# Patient Record
Sex: MALE | Race: WHITE | NOT HISPANIC OR LATINO | Employment: OTHER | URBAN - METROPOLITAN AREA
[De-identification: names, ages, dates, MRNs, and addresses within clinical notes are randomized per-mention and may not be internally consistent; named-entity substitution may affect disease eponyms.]

---

## 2021-04-12 ENCOUNTER — HOSPITAL ENCOUNTER (EMERGENCY)
Facility: HOSPITAL | Age: 66
Discharge: HOME | End: 2021-04-12
Attending: EMERGENCY MEDICINE | Admitting: EMERGENCY MEDICINE
Payer: MEDICARE

## 2021-04-12 VITALS
WEIGHT: 205.2 LBS | BODY MASS INDEX: 33.12 KG/M2 | HEART RATE: 98 BPM | SYSTOLIC BLOOD PRESSURE: 127 MMHG | RESPIRATION RATE: 18 BRPM | OXYGEN SATURATION: 99 % | DIASTOLIC BLOOD PRESSURE: 65 MMHG | TEMPERATURE: 98.7 F

## 2021-04-12 DIAGNOSIS — L97.929 ULCERS OF BOTH LOWER LEGS (HCC): Primary | ICD-10-CM

## 2021-04-12 DIAGNOSIS — L97.919 ULCERS OF BOTH LOWER LEGS (HCC): Primary | ICD-10-CM

## 2021-04-12 LAB
ALBUMIN SERPL BCP-MCNC: 2.2 G/DL (ref 3.5–5)
ALP SERPL-CCNC: 110 U/L (ref 46–116)
ALT SERPL W P-5'-P-CCNC: 38 U/L (ref 12–78)
ANION GAP SERPL CALCULATED.3IONS-SCNC: 10 MMOL/L (ref 4–13)
APTT PPP: 39 SECONDS (ref 23–37)
AST SERPL W P-5'-P-CCNC: 53 U/L (ref 5–45)
BASOPHILS # BLD AUTO: 0.07 THOUSANDS/ΜL (ref 0–0.1)
BASOPHILS NFR BLD AUTO: 1 % (ref 0–1)
BILIRUB SERPL-MCNC: 0.44 MG/DL (ref 0.2–1)
BUN SERPL-MCNC: 14 MG/DL (ref 5–25)
CALCIUM ALBUM COR SERPL-MCNC: 10.6 MG/DL (ref 8.3–10.1)
CALCIUM SERPL-MCNC: 9.2 MG/DL (ref 8.3–10.1)
CHLORIDE SERPL-SCNC: 97 MMOL/L (ref 100–108)
CO2 SERPL-SCNC: 27 MMOL/L (ref 21–32)
CREAT SERPL-MCNC: 1.06 MG/DL (ref 0.6–1.3)
EOSINOPHIL # BLD AUTO: 0.3 THOUSAND/ΜL (ref 0–0.61)
EOSINOPHIL NFR BLD AUTO: 3 % (ref 0–6)
ERYTHROCYTE [DISTWIDTH] IN BLOOD BY AUTOMATED COUNT: 13.4 % (ref 11.6–15.1)
EST. AVERAGE GLUCOSE BLD GHB EST-MCNC: 134 MG/DL
GFR SERPL CREATININE-BSD FRML MDRD: 73 ML/MIN/1.73SQ M
GLUCOSE SERPL-MCNC: 135 MG/DL (ref 65–140)
HBA1C MFR BLD: 6.3 %
HCT VFR BLD AUTO: 40.5 % (ref 36.5–49.3)
HGB BLD-MCNC: 13.3 G/DL (ref 12–17)
HOLD SPECIMEN: NORMAL
IMM GRANULOCYTES # BLD AUTO: 0.12 THOUSAND/UL (ref 0–0.2)
IMM GRANULOCYTES NFR BLD AUTO: 1 % (ref 0–2)
INR PPP: 1.16 (ref 0.84–1.19)
LACTATE SERPL-SCNC: 2.4 MMOL/L (ref 0.5–2)
LYMPHOCYTES # BLD AUTO: 1.39 THOUSANDS/ΜL (ref 0.6–4.47)
LYMPHOCYTES NFR BLD AUTO: 11 % (ref 14–44)
MCH RBC QN AUTO: 28.9 PG (ref 26.8–34.3)
MCHC RBC AUTO-ENTMCNC: 32.8 G/DL (ref 31.4–37.4)
MCV RBC AUTO: 88 FL (ref 82–98)
MONOCYTES # BLD AUTO: 0.71 THOUSAND/ΜL (ref 0.17–1.22)
MONOCYTES NFR BLD AUTO: 6 % (ref 4–12)
NEUTROPHILS # BLD AUTO: 9.59 THOUSANDS/ΜL (ref 1.85–7.62)
NEUTS SEG NFR BLD AUTO: 78 % (ref 43–75)
NRBC BLD AUTO-RTO: 0 /100 WBCS
PLATELET # BLD AUTO: 288 THOUSANDS/UL (ref 149–390)
PMV BLD AUTO: 8.9 FL (ref 8.9–12.7)
POTASSIUM SERPL-SCNC: 4 MMOL/L (ref 3.5–5.3)
PROCALCITONIN SERPL-MCNC: 0.27 NG/ML
PROT SERPL-MCNC: 9.2 G/DL (ref 6.4–8.2)
PROTHROMBIN TIME: 14.9 SECONDS (ref 11.6–14.5)
RBC # BLD AUTO: 4.6 MILLION/UL (ref 3.88–5.62)
SODIUM SERPL-SCNC: 134 MMOL/L (ref 136–145)
WBC # BLD AUTO: 12.18 THOUSAND/UL (ref 4.31–10.16)

## 2021-04-12 PROCEDURE — 87040 BLOOD CULTURE FOR BACTERIA: CPT | Performed by: PHYSICIAN ASSISTANT

## 2021-04-12 PROCEDURE — 84145 PROCALCITONIN (PCT): CPT | Performed by: PHYSICIAN ASSISTANT

## 2021-04-12 PROCEDURE — 96366 THER/PROPH/DIAG IV INF ADDON: CPT

## 2021-04-12 PROCEDURE — 85025 COMPLETE CBC W/AUTO DIFF WBC: CPT | Performed by: EMERGENCY MEDICINE

## 2021-04-12 PROCEDURE — 36415 COLL VENOUS BLD VENIPUNCTURE: CPT

## 2021-04-12 PROCEDURE — 83036 HEMOGLOBIN GLYCOSYLATED A1C: CPT | Performed by: PHYSICIAN ASSISTANT

## 2021-04-12 PROCEDURE — 85730 THROMBOPLASTIN TIME PARTIAL: CPT | Performed by: PHYSICIAN ASSISTANT

## 2021-04-12 PROCEDURE — 83605 ASSAY OF LACTIC ACID: CPT | Performed by: PHYSICIAN ASSISTANT

## 2021-04-12 PROCEDURE — 93005 ELECTROCARDIOGRAM TRACING: CPT

## 2021-04-12 PROCEDURE — 96365 THER/PROPH/DIAG IV INF INIT: CPT

## 2021-04-12 PROCEDURE — 99283 EMERGENCY DEPT VISIT LOW MDM: CPT

## 2021-04-12 PROCEDURE — 80053 COMPREHEN METABOLIC PANEL: CPT | Performed by: EMERGENCY MEDICINE

## 2021-04-12 PROCEDURE — 99285 EMERGENCY DEPT VISIT HI MDM: CPT | Performed by: EMERGENCY MEDICINE

## 2021-04-12 PROCEDURE — 96375 TX/PRO/DX INJ NEW DRUG ADDON: CPT

## 2021-04-12 PROCEDURE — 85610 PROTHROMBIN TIME: CPT | Performed by: PHYSICIAN ASSISTANT

## 2021-04-12 RX ORDER — SULFAMETHOXAZOLE AND TRIMETHOPRIM 800; 160 MG/1; MG/1
1 TABLET ORAL 2 TIMES DAILY
Qty: 20 TABLET | Refills: 0 | Status: SHIPPED | OUTPATIENT
Start: 2021-04-12 | End: 2021-04-22

## 2021-04-12 RX ORDER — CEPHALEXIN 500 MG/1
500 CAPSULE ORAL EVERY 6 HOURS SCHEDULED
Qty: 40 CAPSULE | Refills: 0 | Status: SHIPPED | OUTPATIENT
Start: 2021-04-12 | End: 2021-04-22

## 2021-04-12 RX ORDER — MELOXICAM 15 MG/1
15 TABLET ORAL DAILY
Qty: 10 TABLET | Refills: 0 | Status: SHIPPED | OUTPATIENT
Start: 2021-04-12

## 2021-04-12 RX ORDER — FENTANYL CITRATE 50 UG/ML
25 INJECTION, SOLUTION INTRAMUSCULAR; INTRAVENOUS ONCE
Status: COMPLETED | OUTPATIENT
Start: 2021-04-12 | End: 2021-04-12

## 2021-04-12 RX ADMIN — Medication 2000 MG: at 19:58

## 2021-04-12 RX ADMIN — FENTANYL CITRATE 25 MCG: 50 INJECTION INTRAMUSCULAR; INTRAVENOUS at 22:47

## 2021-04-12 RX ADMIN — VANCOMYCIN HYDROCHLORIDE 1500 MG: 5 INJECTION, POWDER, LYOPHILIZED, FOR SOLUTION INTRAVENOUS at 19:59

## 2021-04-12 NOTE — Clinical Note
Date: 4/12/2021  Patient: Lorenza Palencia  Admitted: 4/12/2021  7:03 PM  Attending Provider: Hilaria Walton MD    Lorenza Palencia or his authorized caregiver has made the decision for the patient to leave the emergency department against the advi ce of Felicitas Browne MD and Kip Flor PA-C  He or his authorized caregiver has been informed and understands the inherent risks, including death, sepsis, osteomyelitis, amputation, further infection, wounds that do not heal, colonization  of bacteria, MRSA infection, difficulty ambulatory  He or his authorized caregiver has decided to accept the responsibility for this decision  Lorenza Palencia and all necessary parties have been advised that he may return for further evaluation or  treatment  His condition at time of discharge was stable, but would benefit from admission    Lorenza Palencia had current vital signs as follows:  /96 (BP Location: Right arm)   Pulse 104   Temp 98 7 °F (37 1 °C) (Oral)   Resp 16   Wt 93 1 k g (205 lb 3 2 oz)

## 2021-04-12 NOTE — ED ATTENDING ATTESTATION
4/12/2021  IAna MD, saw and evaluated the patient  I have discussed the patient with the resident/non-physician practitioner and agree with the resident's/non-physician practitioner's findings, Plan of Care, and MDM as documented in the resident's/non-physician practitioner's note, except where noted  All available labs and Radiology studies were reviewed  I was present for key portions of any procedure(s) performed by the resident/non-physician practitioner and I was immediately available to provide assistance  At this point I agree with the current assessment done in the Emergency Department  I have conducted an independent evaluation of this patient a history and physical is as follows:    ED Course     59-year-old male soft reporting no medical history presents for evaluation of worsening erythema, discomfort and wounds on both lower legs  He reports a long history of working on his feet  For the last 2 months he has developed some wounds on both legs, the right being worse  He does report initial injury to the right leg months ago since healed  He denies fevers, chills, chest pain, abdominal pain, shortness of breath  On exam he has diffuse erythema of both lower legs with multiple fissures in the skin on feet as well as bleeding on the medial aspect of the right leg and some purulent drainage from other wounds on the right  Reports normal sensation distally but has minimal tenderness  Recommended admission for IV antibiotics, wound care  Patient adamant about being discharged  Understands risks that his infection could worsen and lead to permanent disability or death  For now will check labs, give initial dose of IV antibiotics      Critical Care Time  Procedures

## 2021-04-13 LAB
ATRIAL RATE: 92 BPM
P AXIS: 75 DEGREES
PR INTERVAL: 148 MS
QRS AXIS: 11 DEGREES
QRSD INTERVAL: 102 MS
QT INTERVAL: 398 MS
QTC INTERVAL: 492 MS
T WAVE AXIS: 33 DEGREES
VENTRICULAR RATE: 92 BPM

## 2021-04-13 PROCEDURE — 93010 ELECTROCARDIOGRAM REPORT: CPT | Performed by: INTERNAL MEDICINE

## 2021-04-13 NOTE — ED NOTES
1 ML of fentanyl citrate wasted  Witnessed by Leandra Doty RN  Disposed of in sharps container        Nidhi De Jesus RN  04/13/21 1740

## 2021-04-13 NOTE — ED NOTES
Patient provided urine sample cup and asked to urinate  Patient currently unable to go, will reassess later        Ike Thomas RN  04/12/21 2011

## 2021-04-13 NOTE — DISCHARGE INSTRUCTIONS
I have sent you for 2 separate antibiotics  Please take them as prescribed  Please take them for their entire course  I have sent you a pain medication I can be taken once per day  You may use this in conjunction for Tylenol for pain relief  Please do not use this medication with any other ibuprofen derivative products including Advil, Aleve, naproxen, etc   You may take up to 4000 mg of Tylenol with this per day  I have sent you an ambulatory referral for wound care  They should be contacting you within the next few days to make an appointment  If they do not, I have given you information above for wound care office to call  Please continue monitoring your lower extremities  I recommend keeping them dressed and using antibacterial lotions or soaps (Hibiclens, etc )  over top of them for topical antibacterial properties  Please seek help within emergency department for any worsening symptoms including chest pain, palpitations, fever, chills, nausea, vomiting, chills, diaphoresis, inability to walk, inability to eat, significant worsening of lower extremities, etc  Please understand that you are leaving the emergency department against medical advice  Risks of this include but are not limited to, death, sepsis, osteomyelitis, amputation, further infection, wounds that do not heal, colonization of bacteria, MRSA infection, difficulty ambulating, etc

## 2021-04-13 NOTE — ED PROVIDER NOTES
History  Chief Complaint   Patient presents with    Wound Infection     Pt presents to the ED with b/l lower leg swelling and yellow, drainage  Hot and red to the touch  Onset over the last month, worse over the last week  Pt reports often in boots and wet socks for extended amounts of time  This is a 71-year-old male with no significant past medical history presenting to the emergency department for bilateral leg wounds  The patient notes that he sweats a lot at work and wears work boots all the time; approximately 2 weeks ago, he began noticing wounds to his lower extremities  He notes these wounds are not painful but they are now draining yellow discharge  The patient notes the wounds are warm to the touch but not painful  The patient denies any history of diabetes  The patient notes he has not seen a doctor in [address]  The patient denies any fever or chills  The patient denies any headache or visual disturbances  Patient denies any chest pain, shortness of breath, or palpitations  Patient denies any nausea, vomiting, diarrhea, or constipation  Patient denies any urinary symptoms  The patient notes he attempted using a cream on his legs which just made the legs worse  The patient is still able to ambulate without difficulty though it is painful to put socks on  The patient denies other complaints at this time        History provided by:  Patient   used: No    Leg Pain  Location:  Leg  Time since incident:  2 weeks  Injury: no    Leg location:  L leg, R leg, L lower leg and R lower leg  Pain details:     Severity:  No pain    Onset quality:  Gradual    Duration:  2 weeks (though I believe these wounds have been around for much longer)    Timing:  Intermittent    Progression:  Waxing and waning  Chronicity:  New  Dislocation: no    Foreign body present:  No foreign bodies  Tetanus status:  Unknown  Prior injury to area:  No  Relieved by:  Nothing  Worsened by: Nothing  Ineffective treatments:  None tried  Associated symptoms: decreased ROM (secondary to swelling) and swelling    Associated symptoms: no back pain, no fatigue, no fever, no itching, no muscle weakness, no neck pain, no numbness, no stiffness and no tingling    Risk factors: no concern for non-accidental trauma, no known bone disorder, no obesity and no recent illness      None     History reviewed  No pertinent past medical history  History reviewed  No pertinent surgical history  History reviewed  No pertinent family history  I have reviewed and agree with the history as documented  E-Cigarette/Vaping     E-Cigarette/Vaping Substances     Social History     Tobacco Use    Smoking status: Never Smoker   Substance Use Topics    Alcohol use: Yes     Frequency: 4 or more times a week     Drinks per session: 3 or 4    Drug use: Not on file     Review of Systems   Constitutional: Negative for chills, diaphoresis, fatigue and fever  Eyes: Negative for visual disturbance  Respiratory: Negative for cough, chest tightness, shortness of breath and wheezing  Cardiovascular: Positive for leg swelling  Negative for chest pain and palpitations  Gastrointestinal: Negative for abdominal pain, constipation, diarrhea, nausea and vomiting  Genitourinary: Negative for dysuria  Musculoskeletal: Positive for joint swelling (bilateral ankles)  Negative for back pain, neck pain and stiffness  Skin: Positive for color change and wound  Negative for itching, pallor and rash  Neurological: Negative for dizziness, seizures, syncope, weakness, light-headedness, numbness and headaches  Psychiatric/Behavioral: Negative for confusion  All other systems reviewed and are negative  Physical Exam  Physical Exam  Vitals signs and nursing note reviewed  Constitutional:       General: He is not in acute distress  Appearance: Normal appearance  He is normal weight   He is not ill-appearing, toxic-appearing or diaphoretic  HENT:      Head: Normocephalic and atraumatic  Nose: Nose normal       Mouth/Throat:      Mouth: Mucous membranes are moist    Eyes:      General: No scleral icterus  Right eye: No discharge  Left eye: No discharge  Extraocular Movements: Extraocular movements intact  Conjunctiva/sclera: Conjunctivae normal    Cardiovascular:      Rate and Rhythm: Normal rate and regular rhythm  Pulses: Normal pulses  Heart sounds: Normal heart sounds  No murmur  No friction rub  No gallop  Comments: 2+ radial pulses bilaterally  2+ DP pulse on left foot; right foot had intact DP pulse to Doppler ultrasound  Pulmonary:      Effort: Pulmonary effort is normal  No respiratory distress  Breath sounds: Normal breath sounds  No stridor  No wheezing, rhonchi or rales  Chest:      Chest wall: No tenderness  Musculoskeletal:      Right lower leg: Edema present  Left lower leg: Edema present  Comments: Decreased range of motion of bilateral lower extremity secondary to swelling   Skin:     General: Skin is warm and dry  Capillary Refill: Capillary refill takes less than 2 seconds  Coloration: Skin is not jaundiced or pale  Findings: Erythema and lesion present  Comments:  The patient has significant wounds to his right lower extremity, specifically medially and on the patient's heel on the right side, these wounds have purulence and bloody drainage and appear to be weeping  Patient's bilateral lower extremities (especially wounds) are warm and oozing to the touch  The patient has chronic skin changes and dryness to bilateral lower extremities; bilateral lower extremities appear erythematous and infectious  The patient's left lower extremity also contains chronic skin changes and dryness and a more minor wound to the heel; however, this wound still appears weeping and has purulence drainage  No eschar   Neurological:      General: No focal deficit present  Mental Status: He is alert and oriented to person, place, and time  Mental status is at baseline  Comments: Normal sensation of bilateral lower extremities  No pain in bilateral lower extremities   Psychiatric:         Mood and Affect: Mood normal          Behavior: Behavior normal        Vital Signs  ED Triage Vitals   Temperature Pulse Respirations Blood Pressure SpO2   04/12/21 1704 04/12/21 1704 04/12/21 1704 04/12/21 1704 04/12/21 1704   98 7 °F (37 1 °C) 100 16 143/85 99 %      Temp Source Heart Rate Source Patient Position - Orthostatic VS BP Location FiO2 (%)   04/12/21 1704 04/12/21 1704 04/12/21 1704 04/12/21 1704 --   Oral Monitor Sitting Right arm       Pain Score       04/12/21 2247       6         Vitals:    04/12/21 1704 04/12/21 1911 04/12/21 2252   BP: 143/85 161/96 127/65   Pulse: 100 104 98   Patient Position - Orthostatic VS: Sitting Lying Lying     Visual Acuity    ED Medications  Medications   vancomycin (VANCOCIN) 1,500 mg in sodium chloride 0 9 % 250 mL IVPB (0 mg/kg × 93 1 kg Intravenous Stopped 4/12/21 2145)   cefepime (MAXIPIME) 2 g/50 mL dextrose IVPB (0 mg Intravenous Stopped 4/12/21 2145)   fentanyl citrate (PF) 100 MCG/2ML 25 mcg (25 mcg Intravenous Given 4/12/21 2247)     Diagnostic Studies  Results Reviewed     Procedure Component Value Units Date/Time    Hemoglobin A1C [[id number]]  (Abnormal) Collected: 04/12/21 1710    Lab Status: Final result Specimen: Blood from Arm, Right Updated: 04/12/21 2321     Hemoglobin A1C 6 3 %       mg/dl     Lactic acid [[id number]]  (Abnormal) Collected: 04/12/21 1936    Lab Status: Final result Specimen: Blood from Arm, Left Updated: 04/12/21 2311     LACTIC ACID 2 4 mmol/L     Narrative:      Result may be elevated if tourniquet was used during collection      Lactic acid 2 Hours [[id number]]     Lab Status: No result Specimen: Blood     Protime-INR [[id number]]  (Abnormal) Collected: 04/12/21 1936    Lab Status: Final result Specimen: Blood from Arm, Left Updated: 04/12/21 2001     Protime 14 9 seconds      INR 1 16    APTT [[id number]]  (Abnormal) Collected: 04/12/21 1936    Lab Status: Final result Specimen: Blood from Arm, Left Updated: 04/12/21 2001     PTT 39 seconds     Blood culture #2 [[id number]] Collected: 04/12/21 1937    Lab Status: In process Specimen: Blood from Arm, Right Updated: 04/12/21 1944    Blood culture #1 [[id number]] Collected: 04/12/21 1937    Lab Status: In process Specimen: Blood from Arm, Left Updated: 04/12/21 1944    Procalcitonin with AM Reflex [[id number]] Collected: 04/12/21 1936    Lab Status:  In process Specimen: Blood from Arm, Left Updated: 04/12/21 1943    Comprehensive metabolic panel [[id number]]  (Abnormal) Collected: 04/12/21 1710    Lab Status: Final result Specimen: Blood from Arm, Right Updated: 04/12/21 1740     Sodium 134 mmol/L      Potassium 4 0 mmol/L      Chloride 97 mmol/L      CO2 27 mmol/L      ANION GAP 10 mmol/L      BUN 14 mg/dL      Creatinine 1 06 mg/dL      Glucose 135 mg/dL      Calcium 9 2 mg/dL      Corrected Calcium 10 6 mg/dL      AST 53 U/L      ALT 38 U/L      Alkaline Phosphatase 110 U/L      Total Protein 9 2 g/dL      Albumin 2 2 g/dL      Total Bilirubin 0 44 mg/dL      eGFR 73 ml/min/1 73sq m     Narrative:      Kylie guidelines for Chronic Kidney Disease (CKD):     Stage 1 with normal or high GFR (GFR > 90 mL/min/1 73 square meters)    Stage 2 Mild CKD (GFR = 60-89 mL/min/1 73 square meters)    Stage 3A Moderate CKD (GFR = 45-59 mL/min/1 73 square meters)    Stage 3B Moderate CKD (GFR = 30-44 mL/min/1 73 square meters)    Stage 4 Severe CKD (GFR = 15-29 mL/min/1 73 square meters)    Stage 5 End Stage CKD (GFR <15 mL/min/1 73 square meters)  Note: GFR calculation is accurate only with a steady state creatinine    CBC and differential [[id number]]  (Abnormal) Collected: 04/12/21 1710    Lab Status: Final result Specimen: Blood from Arm, Right Updated: 04/12/21 1718     WBC 12 18 Thousand/uL      RBC 4 60 Million/uL      Hemoglobin 13 3 g/dL      Hematocrit 40 5 %      MCV 88 fL      MCH 28 9 pg      MCHC 32 8 g/dL      RDW 13 4 %      MPV 8 9 fL      Platelets 260 Thousands/uL      nRBC 0 /100 WBCs      Neutrophils Relative 78 %      Immat GRANS % 1 %      Lymphocytes Relative 11 %      Monocytes Relative 6 %      Eosinophils Relative 3 %      Basophils Relative 1 %      Neutrophils Absolute 9 59 Thousands/µL      Immature Grans Absolute 0 12 Thousand/uL      Lymphocytes Absolute 1 39 Thousands/µL      Monocytes Absolute 0 71 Thousand/µL      Eosinophils Absolute 0 30 Thousand/µL      Basophils Absolute 0 07 Thousands/µL              No orders to display          Procedures  ECG 12 Lead Documentation Only    Date/Time: 4/12/2021 7:57 PM  Performed by: Dereje Reynoso PA-C  Authorized by: Dereje Reynoso PA-C     Indications / Diagnosis:  Sepsis R/O  ECG reviewed by me, the ED Provider: yes    Patient location:  ED  Previous ECG:     Previous ECG:  Unavailable  Interpretation:     Interpretation: normal    Rate:     ECG rate:  92    ECG rate assessment: normal    Rhythm:     Rhythm: sinus rhythm    Ectopy:     Ectopy: none    QRS:     QRS axis:  Normal  Conduction:     Conduction: normal    ST segments:     ST segments:  Normal  T waves:     T waves: non-specific    Comments:      T wave inversions in V1 - likely normal variant  Normal axis  No ST segment changes or other signs of ischemia  Overall, no signs on this EKG to explain the patient's symptoms        ED Course  ED Course as of Apr 12 2354   Mon Apr 12, 2021 1933 Patient meets SIRS criteria with tachycardia at 100 and WBCs at 12 18  Will initiate sepsis work-up and begin IV antibiotics  2113 Patient refusing admission to the hospital  Will dress the patient's wounds at this time                                          MDM  Number of Diagnoses or Management Options  Ulcers of both lower legs [facility]): new and requires workup  Diagnosis management comments: This is a 71-year-old male with no significant past medical history (though the patient has not been to the doctor in a while) presenting to the emergency department for bilateral leg wounds  Right leg wounds are multiple, weeping, with purulence drainage, with granulation tissue, with surrounding erythema and warmth, and clearly infected  The patient's left leg wounds are located to the heel and are also with purulence drainage  Bilateral lower extremities are dry, brawny, and skin flakes off to the touch  EKG WNL  Lab workup significant for leukocytosis at 12 18, hemoglobin A1c 6 3, and lactic acid 2 4 (though the patient left AMA prior to me being able to resuscitate him with fluids)  Blood cultures drawn  The patient met sirs criteria and thus was given prophylactic antibiotics with vancomycin and cefepime  At that time, the patient noted that he would absolutely not stay here in the hospital overnight  When speaking to the patient, he notes there is no way that he will be staying here overnight  I attempted to explain to him that his wounds are severe and my fear is that the infection could moved either the bloodstream, bone, or other locations, resulting in more significant wounds, sepsis, amputation, osteomyelitis, or even death  The patient still adamantly refused staying here in the emergency department and wanted to sign out AMA  Before this, I spent approximately half an hour dressing the patient's wounds  I scrubbed his bilateral legs with chlorhexidine and dressed his bilateral lower extremities and wounds with Xeroform  I then wrapped his wounds with sterile roll gauze and with Ace bandages bilaterally  I spent specific time washing the patient's significant ulcers with chlorhexidine and ensured that each significant wound was dressed with at least 1 layer of Xeroform    The patient then requested something for pain for which I gave him 25 mcg of fentanyl  I, along with Dr Amy Naidu, both discussed adverse even a could occur if the patient leaves AMA including worsening of wounds, need for amputation, colonization of bacteria, MRSA infection, inability to ambulate, sepsis, need for amputation, and even death  Still, the patient demanded to sign out AMA  I sent the patient Bactrim and Keflex for outpatient antimicrobial therapy in hopes to keep some of the infection at [facility]  I also gave the patient an ambulatory referral to wound care it helps the patient will follow-up with them outpatient  I also gave the patient Mobic for pain relief and instructed him not to take this with any and performed containing products but he may take it with Tylenol  Though I do not believe the patient was stable for discharge at that time, the patient still signed out AMA and left with his loved one  The patient had capacity in all portions of his stay here in the emergency department  When discussing potential adverse outcomes, the patient verified is understanding and agreed to the possibility of these outcomes if he left the hospital against my and Dr Padmini Estrella advise  All questions answered to the patient and his significant other's satisfaction  AMA form signed         Amount and/or Complexity of Data Reviewed  Clinical lab tests: ordered and reviewed  Tests in the radiology section of CPT®: ordered and reviewed  Discuss the patient with other providers: yes  Independent visualization of images, tracings, or specimens: yes    Patient Progress  Patient progress: stable      Disposition  Final diagnoses:   Ulcers of both lower legs ([address] )     Time reflects when diagnosis was documented in both MDM as applicable and the Disposition within this note     Time User Action Codes Description Comment    4/12/2021 10:07 PM Alanna Schwarz Add [V73 [id number], [id number]] Ulcers of both lower legs [facility])       ED Disposition     ED Disposition Condition Date/Time Comment    [facility] Apr 12, 2021 10:04 PM Date: 4/12/2021  Patient: Lili Carcamo  Admitted: 4/12/2021  7:03 PM  Attending Provider: Gaby Shaw MD    Lili Carcamo or his authorized caregiver has made the decision for the patient to leave the emergency department against the advi ce of Shania Rivera MD and Paty Grossman PA-C  He or his authorized caregiver has been informed and understands the inherent risks, including but not limited to, death, sepsis, osteomyelitis, amputation, further infection, wounds that do no t heal, colonization of bacteria, MRSA infection, difficulty ambulatory  He or his authorized caregiver has decided to accept the responsibility for this decision  Lili Carcamo and all necessary parties have been advised that he may return for fu rther evaluation or treatment  His condition at time of discharge was stable, but would benefit from admission    Lili Carcamo had current vital signs as follows:  /96 (BP Location: Right arm)   Pulse 104   Temp 98 7 °F (37 1 °C) (Oral)    Resp 16   Wt 93 1 kg (205 lb 3 2 oz)         Follow-up Information     Follow up With Specialties Details Why Contact Info Additional Information    [address] [facility] Call   [address] [facility] [address] [place] [address], [address] [place] [address] Drive      [address] [place] [address] Schedule an appointment as soon as possible for a visit   [address], [address] [facility], [address] [place] [address]          Discharge Medication List as of 4/12/2021 10:14 PM      START taking these medications    Details   cephalexin (KEFLEX) 500 mg capsule Take 1 capsule (500 mg total) by mouth every 6 (six) hours for 10 days, Starting Mon 4/12/2021, Until Thu 4/22/2021, Normal      meloxicam (MOBIC) 15 mg tablet Take 1 tablet (15 mg total) by mouth daily, Starting Mon 4/12/2021, Normal      sulfamethoxazole-trimethoprim (BACTRIM DS) 800-160 mg per tablet Take 1 tablet by mouth 2 (two) times a day for 10 days smx-tmp DS (BACTRIM) 800-160 mg tabs (1tab q12 D10), Starting Mon 4/12/2021, Until Thu 4/22/2021, Normal           PDMP Review     None        ED Provider  Electronically Signed by     Stella Henning PA-C  04/12/21 8877

## 2021-04-18 LAB
BACTERIA BLD CULT: NORMAL
BACTERIA BLD CULT: NORMAL